# Patient Record
Sex: FEMALE | Race: BLACK OR AFRICAN AMERICAN | NOT HISPANIC OR LATINO
[De-identification: names, ages, dates, MRNs, and addresses within clinical notes are randomized per-mention and may not be internally consistent; named-entity substitution may affect disease eponyms.]

---

## 2017-01-30 ENCOUNTER — APPOINTMENT (OUTPATIENT)
Dept: INFECTIOUS DISEASE | Facility: CLINIC | Age: 32
End: 2017-01-30

## 2017-01-30 DIAGNOSIS — Z71.89 OTHER SPECIFIED COUNSELING: ICD-10-CM

## 2017-01-30 RX ORDER — ATOVAQUONE AND PROGUANIL HYDROCHLORIDE 250; 100 MG/1; MG/1
250-100 TABLET, FILM COATED ORAL DAILY
Qty: 24 | Refills: 0 | Status: ACTIVE | COMMUNITY
Start: 2017-01-30 | End: 1900-01-01

## 2017-01-30 RX ORDER — CIPROFLOXACIN HYDROCHLORIDE 500 MG/1
500 TABLET, FILM COATED ORAL TWICE DAILY
Qty: 12 | Refills: 0 | Status: ACTIVE | COMMUNITY
Start: 2017-01-30 | End: 1900-01-01

## 2017-02-06 ENCOUNTER — TRANSCRIPTION ENCOUNTER (OUTPATIENT)
Age: 32
End: 2017-02-06

## 2017-02-14 ENCOUNTER — APPOINTMENT (OUTPATIENT)
Dept: INFECTIOUS DISEASE | Facility: CLINIC | Age: 32
End: 2017-02-14

## 2017-09-12 ENCOUNTER — EMERGENCY (EMERGENCY)
Facility: HOSPITAL | Age: 32
LOS: 1 days | Discharge: HOME | End: 2017-09-12
Attending: EMERGENCY MEDICINE | Admitting: EMERGENCY MEDICINE
Payer: COMMERCIAL

## 2017-09-12 VITALS
HEIGHT: 66 IN | OXYGEN SATURATION: 99 % | HEART RATE: 108 BPM | DIASTOLIC BLOOD PRESSURE: 82 MMHG | WEIGHT: 207.01 LBS | TEMPERATURE: 98 F | RESPIRATION RATE: 16 BRPM | SYSTOLIC BLOOD PRESSURE: 111 MMHG

## 2017-09-12 VITALS
HEART RATE: 75 BPM | DIASTOLIC BLOOD PRESSURE: 72 MMHG | SYSTOLIC BLOOD PRESSURE: 109 MMHG | RESPIRATION RATE: 16 BRPM | OXYGEN SATURATION: 98 % | TEMPERATURE: 98 F

## 2017-09-12 DIAGNOSIS — K59.00 CONSTIPATION, UNSPECIFIED: ICD-10-CM

## 2017-09-12 DIAGNOSIS — Z91.013 ALLERGY TO SEAFOOD: ICD-10-CM

## 2017-09-12 PROCEDURE — 99284 EMERGENCY DEPT VISIT MOD MDM: CPT

## 2017-09-12 PROCEDURE — 99283 EMERGENCY DEPT VISIT LOW MDM: CPT

## 2017-09-12 RX ADMIN — Medication 1 ENEMA: at 13:11

## 2017-09-12 NOTE — ED PROVIDER NOTE - OBJECTIVE STATEMENT
pt c/o constipation with no bm x 4 days, feeling rectal pressure today. no fevers, chills, abd pain, d/n/v, or blood in stool. pt reports baseline irregular BM's.  pmd - none

## 2017-11-08 ENCOUNTER — APPOINTMENT (OUTPATIENT)
Dept: INFECTIOUS DISEASE | Facility: CLINIC | Age: 32
End: 2017-11-08
Payer: SELF-PAY

## 2017-11-08 PROCEDURE — 90632 HEPA VACCINE ADULT IM: CPT

## 2017-11-08 PROCEDURE — 90471 IMMUNIZATION ADMIN: CPT | Mod: NC

## 2020-06-09 NOTE — ED ADULT NURSE NOTE - TEMPLATE LIST FOR HEAD TO TOE ASSESSMENT
Patient will likely need a repeat chest x-ray. Does she have a fever? Has she been eating and drinking? Any wheezing? Is she coughing a lot? If she is having difficulty breathing or unable to take deep breaths, she may need urgent/emergent assessment. I do not see anything listed in her problem list related to respiratory conditions. If she wanting a referral because of the pneumonia? Abdominal Pain, N/V/D

## 2021-11-23 NOTE — ED ADULT NURSE NOTE - OBJECTIVE STATEMENT
pt reporting constipation since Thursday, pt has done enema without relief. pt states that she had hard pellet like stool today without relief. pt reporting constipation since Thursday, pt has done enema without relief. pt states that she had hard pellet like stool today without relief.    see paper charting Detail Level: Simple